# Patient Record
Sex: MALE | Race: WHITE | NOT HISPANIC OR LATINO | Employment: FULL TIME | URBAN - METROPOLITAN AREA
[De-identification: names, ages, dates, MRNs, and addresses within clinical notes are randomized per-mention and may not be internally consistent; named-entity substitution may affect disease eponyms.]

---

## 2023-05-12 ENCOUNTER — OFFICE VISIT (OUTPATIENT)
Dept: PRIMARY CARE | Facility: CLINIC | Age: 48
End: 2023-05-12
Payer: COMMERCIAL

## 2023-05-12 ENCOUNTER — LAB (OUTPATIENT)
Dept: LAB | Facility: LAB | Age: 48
End: 2023-05-12
Payer: COMMERCIAL

## 2023-05-12 VITALS
WEIGHT: 217.6 LBS | DIASTOLIC BLOOD PRESSURE: 78 MMHG | BODY MASS INDEX: 30.35 KG/M2 | SYSTOLIC BLOOD PRESSURE: 120 MMHG | TEMPERATURE: 97.8 F

## 2023-05-12 DIAGNOSIS — R74.8 ABNORMAL AST AND ALT: ICD-10-CM

## 2023-05-12 DIAGNOSIS — E78.5 HYPERLIPIDEMIA, UNSPECIFIED HYPERLIPIDEMIA TYPE: ICD-10-CM

## 2023-05-12 DIAGNOSIS — K21.9 GASTROESOPHAGEAL REFLUX DISEASE WITHOUT ESOPHAGITIS: ICD-10-CM

## 2023-05-12 DIAGNOSIS — R07.89 ATYPICAL CHEST PAIN: ICD-10-CM

## 2023-05-12 DIAGNOSIS — R73.01 IMPAIRED FASTING BLOOD SUGAR: Primary | ICD-10-CM

## 2023-05-12 DIAGNOSIS — G47.33 OBSTRUCTIVE SLEEP APNEA OF ADULT: ICD-10-CM

## 2023-05-12 DIAGNOSIS — R73.01 IMPAIRED FASTING BLOOD SUGAR: ICD-10-CM

## 2023-05-12 PROBLEM — Z98.84 S/P GASTRIC BYPASS: Status: ACTIVE | Noted: 2023-05-12

## 2023-05-12 LAB
ALANINE AMINOTRANSFERASE (SGPT) (U/L) IN SER/PLAS: 10 U/L (ref 10–52)
ANION GAP IN SER/PLAS: 11 MMOL/L (ref 10–20)
CALCIUM (MG/DL) IN SER/PLAS: 9.2 MG/DL (ref 8.6–10.3)
CARBON DIOXIDE, TOTAL (MMOL/L) IN SER/PLAS: 29 MMOL/L (ref 21–32)
CHLORIDE (MMOL/L) IN SER/PLAS: 107 MMOL/L (ref 98–107)
CHOLESTEROL (MG/DL) IN SER/PLAS: 165 MG/DL (ref 0–199)
CHOLESTEROL IN HDL (MG/DL) IN SER/PLAS: 47.8 MG/DL
CHOLESTEROL/HDL RATIO: 3.5
CREATININE (MG/DL) IN SER/PLAS: 0.87 MG/DL (ref 0.5–1.3)
GFR MALE: >90 ML/MIN/1.73M2
GLUCOSE (MG/DL) IN SER/PLAS: 80 MG/DL (ref 74–99)
LDL: 105 MG/DL (ref 0–99)
POTASSIUM (MMOL/L) IN SER/PLAS: 4.2 MMOL/L (ref 3.5–5.3)
SODIUM (MMOL/L) IN SER/PLAS: 143 MMOL/L (ref 136–145)
TRIGLYCERIDE (MG/DL) IN SER/PLAS: 62 MG/DL (ref 0–149)
UREA NITROGEN (MG/DL) IN SER/PLAS: 17 MG/DL (ref 6–23)
VLDL: 12 MG/DL (ref 0–40)

## 2023-05-12 PROCEDURE — 80061 LIPID PANEL: CPT

## 2023-05-12 PROCEDURE — 99214 OFFICE O/P EST MOD 30 MIN: CPT | Performed by: INTERNAL MEDICINE

## 2023-05-12 PROCEDURE — 1036F TOBACCO NON-USER: CPT | Performed by: INTERNAL MEDICINE

## 2023-05-12 PROCEDURE — 36415 COLL VENOUS BLD VENIPUNCTURE: CPT

## 2023-05-12 PROCEDURE — 83036 HEMOGLOBIN GLYCOSYLATED A1C: CPT

## 2023-05-12 PROCEDURE — 80048 BASIC METABOLIC PNL TOTAL CA: CPT

## 2023-05-12 PROCEDURE — 84460 ALANINE AMINO (ALT) (SGPT): CPT

## 2023-05-12 RX ORDER — SERTRALINE HYDROCHLORIDE 25 MG/1
25 TABLET, FILM COATED ORAL DAILY
COMMUNITY
End: 2024-04-08

## 2023-05-12 ASSESSMENT — PATIENT HEALTH QUESTIONNAIRE - PHQ9
1. LITTLE INTEREST OR PLEASURE IN DOING THINGS: NOT AT ALL
SUM OF ALL RESPONSES TO PHQ9 QUESTIONS 1 AND 2: 0
2. FEELING DOWN, DEPRESSED OR HOPELESS: NOT AT ALL

## 2023-05-12 NOTE — PROGRESS NOTES
Subjective   Patient ID: Mikhail Casarez is a 47 y.o. male who presents for Follow-up (6 month).    HPI   Overall well   1. Dyspnea on exertion- no further issues  2. Atypical chest pain- resolved.    3. IFBS- low sugar/carb intake  4. lipids- diet/exercise   5. abnl CXR- f/u pulm. consider f/u CXR  6. GERD-resolved   7. obesity- status post gastric sleeve surgery. No issues  8. RADHA- resolved. c/s sleep study when wt stabilizes. con't CPAP  9. premature ejaculation-on rx, controlled      Review of Systems   All other systems reviewed and are negative.      Objective   /78   Temp 36.6 °C (97.8 °F)   Wt 98.7 kg (217 lb 9.6 oz)   BMI 30.35 kg/m²     Physical Exam  Constitutional:       Appearance: Normal appearance.   Cardiovascular:      Rate and Rhythm: Normal rate and regular rhythm.      Pulses: Normal pulses.      Heart sounds: No murmur heard.     No gallop.   Pulmonary:      Effort: Pulmonary effort is normal. No respiratory distress.      Breath sounds: Normal breath sounds. No wheezing, rhonchi or rales.   Neurological:      Mental Status: He is alert.   Psychiatric:         Mood and Affect: Mood normal.         Behavior: Behavior normal.         Thought Content: Thought content normal.         Judgment: Judgment normal.         Assessment/Plan   Problem List Items Addressed This Visit          Nervous    Obstructive sleep apnea of adult       Digestive    GERD (gastroesophageal reflux disease)       Other    Abnormal AST and ALT    Relevant Orders    Alanine Aminotransferase    Atypical chest pain    Hyperlipidemia    Relevant Orders    Lipid Panel     Other Visit Diagnoses       Impaired fasting blood sugar    -  Primary    Relevant Orders    Hemoglobin A1C    Basic Metabolic Panel          1. Dyspnea on exertion- resolved. f/u pulm/cards PRN  2. Atypical chest pain-resolved. negative stress echo.  3. IFBS- follow-up blood sugars  4. lipids-retest, rx pending  5. abnl CXR- f/u pulm. consider f/u  CXR  6. GERD-resolved   7. obesity- status post gastric sleeve surgery. Follow-up surgery.  8. RADHA- resolved. c/s sleep study when wt stabilizes. con't CPAP  9. premature ejaculation-reviewed at length. con't low-dose SRI. Follow closely. Reviewed use risk and side effects.  Follow-up 6 months   colon cancer screening, reviewed importance again,  he will schedule.   LDCT at age 49

## 2023-05-13 LAB
ESTIMATED AVERAGE GLUCOSE FOR HBA1C: 114 MG/DL
HEMOGLOBIN A1C/HEMOGLOBIN TOTAL IN BLOOD: 5.6 %

## 2023-09-12 PROBLEM — K90.9 MALABSORPTION (HHS-HCC): Status: ACTIVE | Noted: 2023-09-12

## 2023-09-12 PROBLEM — E55.9 VITAMIN D DEFICIENCY: Status: ACTIVE | Noted: 2023-09-12

## 2023-09-12 PROBLEM — M21.40 FLAT FOOT: Status: ACTIVE | Noted: 2023-09-12

## 2023-09-12 PROBLEM — R03.0 ELEVATED BLOOD PRESSURE READING WITHOUT DIAGNOSIS OF HYPERTENSION: Status: ACTIVE | Noted: 2023-09-12

## 2023-09-12 PROBLEM — M21.062 ACQUIRED GENU VALGUM OF LEFT KNEE: Status: ACTIVE | Noted: 2023-09-12

## 2023-09-12 PROBLEM — J45.20 MILD INTERMITTENT ASTHMA WITHOUT COMPLICATION (HHS-HCC): Status: ACTIVE | Noted: 2023-09-12

## 2023-09-12 PROBLEM — E66.01 MORBID OBESITY (MULTI): Status: ACTIVE | Noted: 2023-09-12

## 2023-09-12 PROBLEM — F52.4 PREMATURE EJACULATION: Status: ACTIVE | Noted: 2023-09-12

## 2023-09-12 PROBLEM — E11.9 DIABETES MELLITUS (MULTI): Status: ACTIVE | Noted: 2023-09-12

## 2023-09-12 RX ORDER — METFORMIN HYDROCHLORIDE 500 MG/1
500 TABLET ORAL
COMMUNITY
End: 2023-11-03 | Stop reason: WASHOUT

## 2023-09-12 RX ORDER — PRENATAL VIT CALC,IRON,FOLIC
TABLET ORAL
COMMUNITY

## 2023-09-12 RX ORDER — OMEPRAZOLE 40 MG/1
40 CAPSULE, DELAYED RELEASE ORAL
COMMUNITY
End: 2023-11-03 | Stop reason: WASHOUT

## 2023-09-12 RX ORDER — IPRATROPIUM BROMIDE 21 UG/1
2 SPRAY, METERED NASAL 2 TIMES DAILY
COMMUNITY
End: 2023-11-03 | Stop reason: WASHOUT

## 2023-09-12 RX ORDER — URSODIOL 300 MG/1
1 CAPSULE ORAL 2 TIMES DAILY
COMMUNITY
Start: 2021-08-16 | End: 2023-11-03 | Stop reason: WASHOUT

## 2023-11-03 ENCOUNTER — OFFICE VISIT (OUTPATIENT)
Dept: PRIMARY CARE | Facility: CLINIC | Age: 48
End: 2023-11-03
Payer: COMMERCIAL

## 2023-11-03 VITALS
TEMPERATURE: 98.7 F | DIASTOLIC BLOOD PRESSURE: 78 MMHG | BODY MASS INDEX: 30.15 KG/M2 | WEIGHT: 220.8 LBS | SYSTOLIC BLOOD PRESSURE: 116 MMHG

## 2023-11-03 DIAGNOSIS — R73.01 IMPAIRED FASTING BLOOD SUGAR: Primary | ICD-10-CM

## 2023-11-03 DIAGNOSIS — E78.5 HYPERLIPIDEMIA, UNSPECIFIED HYPERLIPIDEMIA TYPE: ICD-10-CM

## 2023-11-03 DIAGNOSIS — Z12.11 ENCOUNTER FOR SCREENING FOR MALIGNANT NEOPLASM OF COLON: ICD-10-CM

## 2023-11-03 DIAGNOSIS — K21.9 GASTROESOPHAGEAL REFLUX DISEASE WITHOUT ESOPHAGITIS: ICD-10-CM

## 2023-11-03 PROCEDURE — 1036F TOBACCO NON-USER: CPT | Performed by: INTERNAL MEDICINE

## 2023-11-03 PROCEDURE — 3074F SYST BP LT 130 MM HG: CPT | Performed by: INTERNAL MEDICINE

## 2023-11-03 PROCEDURE — 99213 OFFICE O/P EST LOW 20 MIN: CPT | Performed by: INTERNAL MEDICINE

## 2023-11-03 PROCEDURE — 3044F HG A1C LEVEL LT 7.0%: CPT | Performed by: INTERNAL MEDICINE

## 2023-11-03 PROCEDURE — 3078F DIAST BP <80 MM HG: CPT | Performed by: INTERNAL MEDICINE

## 2023-11-03 ASSESSMENT — PATIENT HEALTH QUESTIONNAIRE - PHQ9
SUM OF ALL RESPONSES TO PHQ9 QUESTIONS 1 AND 2: 0
2. FEELING DOWN, DEPRESSED OR HOPELESS: NOT AT ALL
1. LITTLE INTEREST OR PLEASURE IN DOING THINGS: NOT AT ALL

## 2023-11-03 NOTE — PROGRESS NOTES
Subjective   Patient ID: Mikhail Casarez is a 48 y.o. male who presents for Follow-up (6 months).    HPI   Overall well   1. Dyspnea on exertion- no further issues  2. Atypical chest pain- resolved.    3. IFBS- low sugar/carb intake  4. lipids- diet/exercise   5. abnl CXR- f/u pulm. consider f/u CXR  6. GERD-resolved   7. obesity- status post gastric sleeve surgery. No issues  8. RADHA- resolved. c/s sleep study when wt stabilizes. con't CPAP  9. premature ejaculation-on rx, controlled      Review of Systems   All other systems reviewed and are negative.      Objective   /78   Temp 37.1 °C (98.7 °F)   Wt 100 kg (220 lb 12.8 oz)   BMI 30.15 kg/m²     Physical Exam  Constitutional:       Appearance: Normal appearance.   Cardiovascular:      Rate and Rhythm: Normal rate and regular rhythm.      Pulses: Normal pulses.      Heart sounds: No murmur heard.     No gallop.   Pulmonary:      Effort: Pulmonary effort is normal. No respiratory distress.      Breath sounds: Normal breath sounds. No wheezing, rhonchi or rales.   Neurological:      Mental Status: He is alert.   Psychiatric:         Mood and Affect: Mood normal.         Behavior: Behavior normal.         Thought Content: Thought content normal.         Judgment: Judgment normal.     Lab Results   Component Value Date    WBC 6.6 07/20/2023    HGB 14.9 07/20/2023    HCT 42.9 07/20/2023     07/20/2023    CHOL 165 05/12/2023    TRIG 62 05/12/2023    HDL 47.8 05/12/2023    ALT 13 07/20/2023    AST 14 07/20/2023     07/20/2023    K 3.8 07/20/2023     07/20/2023    CREATININE 0.9 07/20/2023    BUN 14 07/20/2023    CO2 26 07/20/2023    TSH 0.94 04/22/2021    INR 1.0 04/22/2021    HGBA1C 5.6 05/12/2023     Lab Results   Component Value Date    WBC 6.6 07/20/2023    HGB 14.9 07/20/2023    HCT 42.9 07/20/2023     07/20/2023    CHOL 165 05/12/2023    TRIG 62 05/12/2023    HDL 47.8 05/12/2023    ALT 13 07/20/2023    AST 14 07/20/2023      07/20/2023    K 3.8 07/20/2023     07/20/2023    CREATININE 0.9 07/20/2023    BUN 14 07/20/2023    CO2 26 07/20/2023    TSH 0.94 04/22/2021    INR 1.0 04/22/2021    HGBA1C 5.6 05/12/2023         Assessment/Plan   Problem List Items Addressed This Visit    None  1. Dyspnea on exertion- resolved. f/u pulm/cards PRN  2. Atypical chest pain-resolved. negative stress echo.  3. IFBS- follow-up blood sugars  4. lipids-retest, rx pending  5.  premature ejaculation-reviewed at length. con't low-dose SRI. Follow closely. Reviewed use risk and side effects.  6. GERD-resolved   7. obesity- status post gastric sleeve surgery. Follow-up surgery.  8. RADHA- resolved. c/s sleep study when wt stabilizes. con't CPAP     Follow-up 6 months   colon cancer screening, reviewed importance again,  he will schedule. reviewed  LDCT at age 49

## 2023-12-11 ENCOUNTER — TELEPHONE (OUTPATIENT)
Dept: PRIMARY CARE | Facility: CLINIC | Age: 48
End: 2023-12-11
Payer: COMMERCIAL

## 2023-12-11 NOTE — TELEPHONE ENCOUNTER
----- Message from Deepthi Ross MD sent at 12/10/2023  7:49 AM EST -----  Regarding: FW: [facility] clearance  Contact: [phone]  ok  ----- Message -----  From: Justa Lockhart CMA  Sent: 12/8/2023   6:52 AM EST  To: Deepthi Ross MD  Subject: FW: [facility] clearance                       ----- Message -----  From: Mikhail Casarez  Sent: 12/7/2023   1:18 PM EST  To:  [id number]  Subject: [facility] clearance                         This is the [facility] release form we discussed at my last appointment. If you could fill it out and send back I would appreciate it. Thanks

## 2023-12-14 ENCOUNTER — TELEPHONE (OUTPATIENT)
Dept: PRIMARY CARE | Facility: CLINIC | Age: 48
End: 2023-12-14
Payer: COMMERCIAL

## 2023-12-14 NOTE — TELEPHONE ENCOUNTER
----- Message from Deepthi Ross MD sent at 12/10/2023  7:49 AM EST -----  Regarding: FW: [facility] clearance  Contact: [phone]  ok  ----- Message -----  From: Justa Lockhart CMA  Sent: 12/8/2023   6:52 AM EST  To: Deepthi Ross MD  Subject: FW: [facility] clearance                       ----- Message -----  From: Mikhail Casarez  Sent: 12/7/2023   1:18 PM EST  To:  [id number]  Subject: [facility] clearance                         This is the [facility] [facility] release form we discussed at my last appointment. If you could fill it out and send back I would appreciate it. Thanks

## 2024-01-12 PROCEDURE — 88305 TISSUE EXAM BY PATHOLOGIST: CPT

## 2024-01-12 PROCEDURE — 88305 TISSUE EXAM BY PATHOLOGIST: CPT | Performed by: PATHOLOGY

## 2024-01-15 ENCOUNTER — LAB REQUISITION (OUTPATIENT)
Dept: LAB | Facility: HOSPITAL | Age: 49
End: 2024-01-15
Payer: COMMERCIAL

## 2024-01-15 DIAGNOSIS — Z12.11 ENCOUNTER FOR SCREENING FOR MALIGNANT NEOPLASM OF COLON: ICD-10-CM

## 2024-01-19 LAB
LABORATORY COMMENT REPORT: NORMAL
PATH REPORT.FINAL DX SPEC: NORMAL
PATH REPORT.GROSS SPEC: NORMAL
PATH REPORT.TOTAL CANCER: NORMAL

## 2024-02-23 DIAGNOSIS — M79.672 FOOT PAIN, LEFT: ICD-10-CM

## 2024-02-28 ENCOUNTER — HOSPITAL ENCOUNTER (OUTPATIENT)
Dept: RADIOLOGY | Facility: HOSPITAL | Age: 49
Discharge: HOME | End: 2024-02-28
Payer: COMMERCIAL

## 2024-02-28 ENCOUNTER — OFFICE VISIT (OUTPATIENT)
Dept: ORTHOPEDIC SURGERY | Facility: CLINIC | Age: 49
End: 2024-02-28
Payer: COMMERCIAL

## 2024-02-28 VITALS — HEIGHT: 72 IN | WEIGHT: 215 LBS | BODY MASS INDEX: 29.12 KG/M2

## 2024-02-28 DIAGNOSIS — M79.672 FOOT PAIN, LEFT: ICD-10-CM

## 2024-02-28 DIAGNOSIS — M72.2 PLANTAR FASCIITIS: Primary | ICD-10-CM

## 2024-02-28 PROCEDURE — 73630 X-RAY EXAM OF FOOT: CPT | Mod: LT

## 2024-02-28 PROCEDURE — 99213 OFFICE O/P EST LOW 20 MIN: CPT | Performed by: SPECIALIST

## 2024-02-28 PROCEDURE — 73630 X-RAY EXAM OF FOOT: CPT | Mod: LEFT SIDE | Performed by: RADIOLOGY

## 2024-02-28 PROCEDURE — 1036F TOBACCO NON-USER: CPT | Performed by: SPECIALIST

## 2024-02-28 NOTE — PROGRESS NOTES
Left foot pain - states the pain has changed since he was here last in 2021. No new injury.  He has recently started feeling a burning sensation in the bottom of his heel. Only last a little bit then subsides.   Has orthotics in his shoes, wants new ones.     Had bariatric surgery and has lost almost 150lbs.     Xrays done.     Exam: In stance he has symmetric bilateral pes planus without erythema or swelling.  Dermis intact neurovascular intact.  Minimal pain to palpation of the plantar left heel.    Radiographs: Show symmetric pes planus without significant other acute bone or joint abnormality    Assessment/plan: Flexible pes planus/hyperpronation.  Per description and exam possible early plantar fasciitis he is given a conservative treatment plan for that.  Mostly he is resolved his knee and foot issues with tremendous weight loss.  He is given a prescription for up-to-date custom foot orthotics as his current set are dated and lacking support.  Follow-up as needed

## 2024-04-07 DIAGNOSIS — F52.4 PREMATURE EJACULATION: ICD-10-CM

## 2024-04-08 RX ORDER — SERTRALINE HYDROCHLORIDE 25 MG/1
TABLET, FILM COATED ORAL
Qty: 90 TABLET | Refills: 2 | Status: SHIPPED | OUTPATIENT
Start: 2024-04-08

## 2024-04-19 ENCOUNTER — OFFICE VISIT (OUTPATIENT)
Dept: PRIMARY CARE | Facility: CLINIC | Age: 49
End: 2024-04-19
Payer: COMMERCIAL

## 2024-04-19 ENCOUNTER — LAB (OUTPATIENT)
Dept: LAB | Facility: LAB | Age: 49
End: 2024-04-19
Payer: COMMERCIAL

## 2024-04-19 VITALS — WEIGHT: 223.4 LBS | SYSTOLIC BLOOD PRESSURE: 124 MMHG | DIASTOLIC BLOOD PRESSURE: 82 MMHG | BODY MASS INDEX: 30.51 KG/M2

## 2024-04-19 DIAGNOSIS — G47.33 OBSTRUCTIVE SLEEP APNEA OF ADULT: ICD-10-CM

## 2024-04-19 DIAGNOSIS — F52.4 PREMATURE EJACULATION: ICD-10-CM

## 2024-04-19 DIAGNOSIS — E21.3 HYPERPARATHYROIDISM (MULTI): Primary | ICD-10-CM

## 2024-04-19 DIAGNOSIS — J45.20 MILD INTERMITTENT ASTHMA WITHOUT COMPLICATION (HHS-HCC): ICD-10-CM

## 2024-04-19 DIAGNOSIS — K21.9 GASTROESOPHAGEAL REFLUX DISEASE WITHOUT ESOPHAGITIS: ICD-10-CM

## 2024-04-19 DIAGNOSIS — R73.01 IMPAIRED FASTING BLOOD SUGAR: ICD-10-CM

## 2024-04-19 PROBLEM — E11.9 DIABETES MELLITUS (MULTI): Status: RESOLVED | Noted: 2023-09-12 | Resolved: 2024-04-19

## 2024-04-19 LAB
ANION GAP SERPL CALC-SCNC: 11 MMOL/L (ref 10–20)
BUN SERPL-MCNC: 18 MG/DL (ref 6–23)
CALCIUM SERPL-MCNC: 9.1 MG/DL (ref 8.6–10.3)
CHLORIDE SERPL-SCNC: 106 MMOL/L (ref 98–107)
CO2 SERPL-SCNC: 28 MMOL/L (ref 21–32)
CREAT SERPL-MCNC: 0.84 MG/DL (ref 0.5–1.3)
EGFRCR SERPLBLD CKD-EPI 2021: >90 ML/MIN/1.73M*2
ERYTHROCYTE [DISTWIDTH] IN BLOOD BY AUTOMATED COUNT: 13.2 % (ref 11.5–14.5)
EST. AVERAGE GLUCOSE BLD GHB EST-MCNC: 126 MG/DL
GLUCOSE SERPL-MCNC: 87 MG/DL (ref 74–99)
HBA1C MFR BLD: 6 %
HCT VFR BLD AUTO: 44.3 % (ref 41–52)
HGB BLD-MCNC: 14.9 G/DL (ref 13.5–17.5)
MCH RBC QN AUTO: 30.4 PG (ref 26–34)
MCHC RBC AUTO-ENTMCNC: 33.6 G/DL (ref 32–36)
MCV RBC AUTO: 90 FL (ref 80–100)
NRBC BLD-RTO: 0 /100 WBCS (ref 0–0)
PLATELET # BLD AUTO: 198 X10*3/UL (ref 150–450)
POTASSIUM SERPL-SCNC: 4.1 MMOL/L (ref 3.5–5.3)
RBC # BLD AUTO: 4.9 X10*6/UL (ref 4.5–5.9)
SODIUM SERPL-SCNC: 141 MMOL/L (ref 136–145)
WBC # BLD AUTO: 6.4 X10*3/UL (ref 4.4–11.3)

## 2024-04-19 PROCEDURE — 99214 OFFICE O/P EST MOD 30 MIN: CPT | Performed by: INTERNAL MEDICINE

## 2024-04-19 PROCEDURE — 1036F TOBACCO NON-USER: CPT | Performed by: INTERNAL MEDICINE

## 2024-04-19 PROCEDURE — 85027 COMPLETE CBC AUTOMATED: CPT

## 2024-04-19 PROCEDURE — 80048 BASIC METABOLIC PNL TOTAL CA: CPT

## 2024-04-19 PROCEDURE — 36415 COLL VENOUS BLD VENIPUNCTURE: CPT

## 2024-04-19 PROCEDURE — 83036 HEMOGLOBIN GLYCOSYLATED A1C: CPT

## 2024-04-19 ASSESSMENT — PATIENT HEALTH QUESTIONNAIRE - PHQ9
2. FEELING DOWN, DEPRESSED OR HOPELESS: NOT AT ALL
1. LITTLE INTEREST OR PLEASURE IN DOING THINGS: NOT AT ALL
SUM OF ALL RESPONSES TO PHQ9 QUESTIONS 1 AND 2: 0

## 2024-04-19 NOTE — PROGRESS NOTES
Subjective   Patient ID: Mikhail Casarez is a 48 y.o. male who presents for Follow-up (6 month fu).    HPI   Overall well   Less active over past mths    1. Dyspnea on exertion- no further issues  2. Atypical chest pain- resolved.    3. IFBS- low sugar/carb intake  4. lipids- diet/exercise   5. abnl CXR- f/u pulm. consider f/u CXR  6. GERD-resolved   7. obesity- status post gastric sleeve surgery. No issues  8. RADHA- resolved. c/s sleep study when wt stabilizes. con't CPAP  9. premature ejaculation-on rx, controlled      Review of Systems   All other systems reviewed and are negative.      Objective   /82 (BP Location: Left arm, Patient Position: Sitting, BP Cuff Size: Adult)   Wt 101 kg (223 lb 6.4 oz)   BMI 30.51 kg/m²     Physical Exam  Constitutional:       Appearance: Normal appearance.   Cardiovascular:      Rate and Rhythm: Normal rate and regular rhythm.      Pulses: Normal pulses.      Heart sounds: No murmur heard.     No gallop.   Pulmonary:      Effort: Pulmonary effort is normal. No respiratory distress.      Breath sounds: Normal breath sounds. No wheezing, rhonchi or rales.   Neurological:      Mental Status: He is alert.   Psychiatric:         Mood and Affect: Mood normal.         Behavior: Behavior normal.         Thought Content: Thought content normal.         Judgment: Judgment normal.       Lab Results   Component Value Date    WBC 6.6 07/20/2023    HGB 14.9 07/20/2023    HCT 42.9 07/20/2023     07/20/2023    CHOL 165 05/12/2023    TRIG 62 05/12/2023    HDL 47.8 05/12/2023    ALT 13 07/20/2023    AST 14 07/20/2023     07/20/2023    K 3.8 07/20/2023     07/20/2023    CREATININE 0.9 07/20/2023    BUN 14 07/20/2023    CO2 26 07/20/2023    TSH 0.94 04/22/2021    INR 1.0 04/22/2021    HGBA1C 5.6 05/12/2023           Assessment/Plan   Problem List Items Addressed This Visit    None  1. Dyspnea on exertion- resolved. f/u pulm/cards PRN  2. Atypical chest pain-resolved. negative  stress echo.  3. IFBS- follow-up blood sugars  4. lipids-retest, rx pending  5.  premature ejaculation-reviewed at length. con't low-dose SRI. Follow closely. Reviewed use risk and side effects.  6. GERD-resolved   7. obesity- status post gastric sleeve surgery. Follow-up surgery.  8. RADHA- resolved. c/s sleep study when wt stabilizes. con't CPAP    Colon polyps- scope 1/24--> 5 yrs per pt    Follow-up 6 months      LDCT at age 49

## 2024-07-31 ENCOUNTER — LAB (OUTPATIENT)
Dept: LAB | Facility: LAB | Age: 49
End: 2024-07-31
Payer: COMMERCIAL

## 2024-07-31 DIAGNOSIS — K90.9 INTESTINAL MALABSORPTION, UNSPECIFIED TYPE (HHS-HCC): ICD-10-CM

## 2024-07-31 LAB
25(OH)D3 SERPL-MCNC: 58 NG/ML (ref 31–100)
ALBUMIN SERPL-MCNC: 4.3 G/DL (ref 3.5–5)
ALP BLD-CCNC: 82 U/L (ref 35–125)
ALT SERPL-CCNC: 13 U/L (ref 5–40)
ANION GAP SERPL CALC-SCNC: 12 MMOL/L
AST SERPL-CCNC: 15 U/L (ref 5–40)
BASOPHILS # BLD AUTO: 0.04 X10*3/UL (ref 0–0.1)
BASOPHILS NFR BLD AUTO: 0.5 %
BILIRUB SERPL-MCNC: 0.6 MG/DL (ref 0.1–1.2)
BUN SERPL-MCNC: 20 MG/DL (ref 8–25)
CALCIUM SERPL-MCNC: 9.6 MG/DL (ref 8.5–10.4)
CHLORIDE SERPL-SCNC: 102 MMOL/L (ref 97–107)
CO2 SERPL-SCNC: 26 MMOL/L (ref 24–31)
CREAT SERPL-MCNC: 1 MG/DL (ref 0.4–1.6)
EGFRCR SERPLBLD CKD-EPI 2021: >90 ML/MIN/1.73M*2
EOSINOPHIL # BLD AUTO: 0.1 X10*3/UL (ref 0–0.7)
EOSINOPHIL NFR BLD AUTO: 1.3 %
ERYTHROCYTE [DISTWIDTH] IN BLOOD BY AUTOMATED COUNT: 13.2 % (ref 11.5–14.5)
FERRITIN SERPL-MCNC: 116 NG/ML (ref 30–400)
FOLATE SERPL-MCNC: 18.6 NG/ML (ref 4.2–19.9)
GLUCOSE SERPL-MCNC: 83 MG/DL (ref 65–99)
HCT VFR BLD AUTO: 44.3 % (ref 41–52)
HGB BLD-MCNC: 14.7 G/DL (ref 13.5–17.5)
IMM GRANULOCYTES # BLD AUTO: 0.02 X10*3/UL (ref 0–0.7)
IMM GRANULOCYTES NFR BLD AUTO: 0.3 % (ref 0–0.9)
IRON SATN MFR SERPL: 27 % (ref 12–50)
IRON SERPL-MCNC: 99 UG/DL (ref 45–160)
LYMPHOCYTES # BLD AUTO: 1.72 X10*3/UL (ref 1.2–4.8)
LYMPHOCYTES NFR BLD AUTO: 22.9 %
MCH RBC QN AUTO: 30 PG (ref 26–34)
MCHC RBC AUTO-ENTMCNC: 33.2 G/DL (ref 32–36)
MCV RBC AUTO: 90 FL (ref 80–100)
MONOCYTES # BLD AUTO: 0.42 X10*3/UL (ref 0.1–1)
MONOCYTES NFR BLD AUTO: 5.6 %
NEUTROPHILS # BLD AUTO: 5.22 X10*3/UL (ref 1.2–7.7)
NEUTROPHILS NFR BLD AUTO: 69.4 %
NRBC BLD-RTO: 0 /100 WBCS (ref 0–0)
PLATELET # BLD AUTO: 190 X10*3/UL (ref 150–450)
POTASSIUM SERPL-SCNC: 4.1 MMOL/L (ref 3.4–5.1)
PROT SERPL-MCNC: 6.2 G/DL (ref 5.9–7.9)
PTH-INTACT SERPL-MCNC: 64.4 PG/ML (ref 18.5–88)
RBC # BLD AUTO: 4.9 X10*6/UL (ref 4.5–5.9)
SODIUM SERPL-SCNC: 140 MMOL/L (ref 133–145)
TIBC SERPL-MCNC: 366 UG/DL (ref 228–428)
UIBC SERPL-MCNC: 267 UG/DL (ref 110–370)
VIT B12 SERPL-MCNC: 890 PG/ML (ref 211–946)
WBC # BLD AUTO: 7.5 X10*3/UL (ref 4.4–11.3)

## 2024-07-31 PROCEDURE — 36415 COLL VENOUS BLD VENIPUNCTURE: CPT

## 2024-07-31 PROCEDURE — 84425 ASSAY OF VITAMIN B-1: CPT

## 2024-07-31 PROCEDURE — 82607 VITAMIN B-12: CPT

## 2024-07-31 PROCEDURE — 82525 ASSAY OF COPPER: CPT

## 2024-07-31 PROCEDURE — 83970 ASSAY OF PARATHORMONE: CPT

## 2024-07-31 PROCEDURE — 83550 IRON BINDING TEST: CPT

## 2024-07-31 PROCEDURE — 85025 COMPLETE CBC W/AUTO DIFF WBC: CPT

## 2024-07-31 PROCEDURE — 82746 ASSAY OF FOLIC ACID SERUM: CPT

## 2024-07-31 PROCEDURE — 80053 COMPREHEN METABOLIC PANEL: CPT

## 2024-07-31 PROCEDURE — 83540 ASSAY OF IRON: CPT

## 2024-07-31 PROCEDURE — 82306 VITAMIN D 25 HYDROXY: CPT

## 2024-07-31 PROCEDURE — 84630 ASSAY OF ZINC: CPT

## 2024-07-31 PROCEDURE — 82728 ASSAY OF FERRITIN: CPT

## 2024-08-02 LAB
COPPER SERPL-MCNC: 95.1 UG/DL (ref 70–140)
ZINC SERPL-MCNC: 83.6 UG/DL (ref 60–120)

## 2024-08-05 NOTE — PROGRESS NOTES
Subjective   Patient ID: Mikhail Casarez is a 48 y.o. male who presents for No chief complaint on file..  HPI  3 YR FUV SLEEVE/ HHR/ UMBILICAL HERNIA REPAIR  START WT:  362  IDEAL WT:  182   START EXCESS: 180    TOTAL WT LOSS:  143     EWL:   79   % HT:  71.75   IN.  LAB RESULTS ARE IN EPIC  Review of Systems  Bariatric Surgery F/U:          Seen at ER after surgery? No.  Hospital admission? No.  Bariatric reoperation? No.  Bariatric intervention? No.  Was anticoagulation initiated for presumed/confirmed Vein Thrombosis/PE? No.  Was an incisional hernia noted on exam? No.  Sleep Apnea? No.  Still using C-PAP? No.  GERD req. meds? No.  Hyperlipidemia? No.  Still taking Cholesterol med? No.  Hypertension? No.  Still taking HTN med? No.  Number of Anti-hypertensive Medications: 0.  Diabetes? No.  Still taking DM med? No.  Current DM medication type: _____.         CONSTITUTIONAL:          Chills No.  Fatigue No.  Fever No.         CARDIOLOGY:          Negative for dizziness, chest pain, palpitations, shortness of breath.         RESPIRATORY:          Negative for chest congestion, cough, wheezing.         GASTROENTEROLOGY:          Food Intolerance No.  Acid reflux No.  Abdominal pain No.  Black stools No.  Constipation No.  Diarrhea No.  Loss of appetite no.  Nausea No.  Vomiting No.         UROLOGY:          Negative for dysuria, urinary urgency, kidney stones.         PSYCHOLOGY:          Negative for depression, anxiety, high stress level.   Objective   Physical Exam    Assessment/Plan            Elvira Steinberg LPN 08/05/24 11:06 AM

## 2024-08-06 LAB — VIT B1 PYROPHOSHATE BLD-SCNC: 150 NMOL/L (ref 70–180)

## 2024-08-08 ENCOUNTER — OFFICE VISIT (OUTPATIENT)
Dept: SURGERY | Facility: CLINIC | Age: 49
End: 2024-08-08
Payer: COMMERCIAL

## 2024-08-08 ENCOUNTER — NUTRITION (OUTPATIENT)
Dept: SURGERY | Facility: CLINIC | Age: 49
End: 2024-08-08

## 2024-08-08 VITALS
DIASTOLIC BLOOD PRESSURE: 75 MMHG | BODY MASS INDEX: 29.66 KG/M2 | SYSTOLIC BLOOD PRESSURE: 118 MMHG | HEIGHT: 72 IN | WEIGHT: 219 LBS | HEART RATE: 73 BPM

## 2024-08-08 DIAGNOSIS — Z90.3 HISTORY OF SLEEVE GASTRECTOMY: ICD-10-CM

## 2024-08-08 DIAGNOSIS — E53.8 B12 DEFICIENCY: ICD-10-CM

## 2024-08-08 DIAGNOSIS — E21.3 HYPERPARATHYROIDISM (MULTI): ICD-10-CM

## 2024-08-08 DIAGNOSIS — E55.9 VITAMIN D DEFICIENCY: ICD-10-CM

## 2024-08-08 DIAGNOSIS — K90.9 INTESTINAL MALABSORPTION, UNSPECIFIED TYPE (HHS-HCC): Primary | ICD-10-CM

## 2024-08-08 PROCEDURE — 3008F BODY MASS INDEX DOCD: CPT | Performed by: SURGERY

## 2024-08-08 PROCEDURE — 99214 OFFICE O/P EST MOD 30 MIN: CPT | Performed by: SURGERY

## 2024-08-08 ASSESSMENT — PAIN SCALES - GENERAL: PAINLEVEL: 0-NO PAIN

## 2024-08-08 ASSESSMENT — PATIENT HEALTH QUESTIONNAIRE - PHQ9
1. LITTLE INTEREST OR PLEASURE IN DOING THINGS: NOT AT ALL
2. FEELING DOWN, DEPRESSED OR HOPELESS: NOT AT ALL
SUM OF ALL RESPONSES TO PHQ9 QUESTIONS 1 AND 2: 0

## 2024-08-08 ASSESSMENT — COLUMBIA-SUICIDE SEVERITY RATING SCALE - C-SSRS
2. HAVE YOU ACTUALLY HAD ANY THOUGHTS OF KILLING YOURSELF?: NO
1. IN THE PAST MONTH, HAVE YOU WISHED YOU WERE DEAD OR WISHED YOU COULD GO TO SLEEP AND NOT WAKE UP?: NO
6. HAVE YOU EVER DONE ANYTHING, STARTED TO DO ANYTHING, OR PREPARED TO DO ANYTHING TO END YOUR LIFE?: NO

## 2024-08-08 NOTE — PROGRESS NOTES
Bariatric Post-Op Follow Up Appointment: 3 yr VSG     Current Weight: 219 lbs  Current BMI: 29.91   Percentage of weight loss achieved: 79    Dietary Intake: the lifelong rules   Breakfast- a Premier Protein shake   Lunch- same options as dinner   Dinner- mostly fish or chicken. Pork on occasion   Volume of food at a time: 8-10 oz per meal   Snacks: none   Beverages: mostly water with flavored drops, daily protein shake, zero sugar soda on occasion   Alcohol Intake: very rare; 1 drink every few months   Do you drink with your meals? No   Current Exercise: gym exercise 5x/week, plus hiking/yoga on the weekends. Mikhail also does a althea 5K monthly.   Vitamins/minerals: Celebrate MVI 1x/day plus calcium citrate at least 2x/day.  Food intolerances? Pizza   Any decrease in energy levels? No   Any questions or concerns? No       Sara Cordero RD, LDN  Registered Dietitian, Licensed Dietitian Nutritionist

## 2024-08-08 NOTE — H&P
History Of Present Illness  Mikhail Casarez is a 48 y.o. man here for 3 year follow up from [facility].  He feels great.  He has no GERD.  He has minimal hunger.  He has lost 79% of his excess weight.  He feels full with 8oz of food.  He is following the rules.  He takes celebrate 18 and calcium citrate tid.  He had labwork for this visit.  His labs are normal.  He goes to the gym 5 days per week and does yoga or hikes on the weekend.    3 YR FUV SLEEVE/ HHR/ UMBILICAL HERNIA REPAIR  START WT:  362  IDEAL WT:  182   START EXCESS: 180    TOTAL WT LOSS:  143     EWL:   79   % HT:  71.75   IN.  Past Medical History  He has a past medical history of Anxiety and Depression.    Surgical History  He has a past surgical history that includes Other surgical history (09/16/2021).     Social History  He reports that he quit smoking about 3 years ago. His smoking use included cigarettes. He has never used smokeless tobacco. He reports that he does not currently use alcohol. He reports that he does not use drugs.    Family History  Family History   Problem Relation Name Age of Onset    Other (Brain Tumor) Mother          benign    Throat cancer Father      Lung cancer Father      Macular degeneration Maternal Grandmother      Hypertension Maternal Grandmother      Prostate cancer Maternal Grandfather      Dementia Paternal Grandmother          [place]    Lung cancer Paternal Grandfather          Allergies  Cat dander and House dust    Review of Systems   Bariatric Surgery F/U:          Seen at ER after surgery? No.  Hospital admission? No.  Bariatric reoperation? No.  Bariatric intervention? No.  Was anticoagulation initiated for presumed/confirmed Vein Thrombosis/PE? No.  Was an incisional hernia noted on exam? No.  Sleep Apnea? No.  Still using C-PAP? No.  GERD req. meds? No.  Hyperlipidemia? No.  Still taking Cholesterol med? No.  Hypertension? No.  Still taking HTN med? No.  Number of Anti-hypertensive Medications: 0.  Diabetes?  "No.  Still taking DM med? No.  Current DM medication type: _____.         CONSTITUTIONAL:          Chills No.  Fatigue No.  Fever No.         CARDIOLOGY:          Negative for dizziness, chest pain, palpitations, shortness of breath.         RESPIRATORY:          Negative for chest congestion, cough, wheezing.         GASTROENTEROLOGY:          Food Intolerance No.  Acid reflux No.  Abdominal pain No.  Black stools No.  Constipation No.  Diarrhea No.  Loss of appetite no.  Nausea No.  Vomiting No.         UROLOGY:          Negative for dysuria, urinary urgency, kidney stones.         PSYCHOLOGY:          Negative for depression, anxiety, high stress level.         Last Recorded Vitals  Blood pressure 118/75, pulse 73, height 1.822 m (5' 11.75\"), weight 99.3 kg (219 lb).    Relevant Results   Latest Reference Range & Units 07/31/24 11:17   GLUCOSE 65 - 99 mg/dL 83   SODIUM 133 - 145 mmol/L 140   POTASSIUM 3.4 - 5.1 mmol/L 4.1   CHLORIDE 97 - 107 mmol/L 102   Bicarbonate 24 - 31 mmol/L 26   Anion Gap <=19 mmol/L 12   Blood Urea Nitrogen 8 - 25 mg/dL 20   Creatinine 0.40 - 1.60 mg/dL 1.00   EGFR >60 mL/min/1.73m*2 >90   Calcium 8.5 - 10.4 mg/dL 9.6   Albumin 3.5 - 5.0 g/dL 4.3   Alkaline Phosphatase 35 - 125 U/L 82   ALT 5 - 40 U/L 13   AST 5 - 40 U/L 15   Bilirubin Total 0.1 - 1.2 mg/dL 0.6   FERRITIN 30 - 400 ng/mL 116   FOLATE 4.2 - 19.9 ng/mL 18.6   Total Protein 5.9 - 7.9 g/dL 6.2   IRON 45 - 160 ug/dL 99   TIBC 228 - 428 ug/dL 366   UIBC 110 - 370 ug/dL 267   % Saturation 12 - 50 % 27   Vitamin B12 211 - 946 pg/mL 890   Copper 70.0 - 140.0 ug/dL 95.1   Zinc, Serum or Plasma 60.0 - 120.0 ug/dL 83.6   Vitamin B1, Whole Blood 70 - 180 nmol/L 150   Parathyroid Hormone, Intact 18.5 - 88.0 pg/mL 64.4   Vitamin D, 25-Hydroxy, Total 31 - 100 ng/mL 58   LEUKOCYTES (10*3/UL) IN BLOOD BY AUTOMATED COUNT, Lithuanian 4.4 - 11.3 x10*3/uL 7.5   nRBC 0.0 - 0.0 /100 WBCs 0.0   ERYTHROCYTES (10*6/UL) IN BLOOD BY AUTOMATED COUNT, " Trinidadian 4.50 - 5.90 x10*6/uL 4.90   HEMOGLOBIN 13.5 - 17.5 g/dL 14.7   HEMATOCRIT 41.0 - 52.0 % 44.3   MCV 80 - 100 fL 90   MCH 26.0 - 34.0 pg 30.0   MCHC 32.0 - 36.0 g/dL 33.2   RED CELL DISTRIBUTION WIDTH 11.5 - 14.5 % 13.2   PLATELETS (10*3/UL) IN BLOOD AUTOMATED COUNT, Trinidadian 150 - 450 x10*3/uL 190   NEUTROPHILS/100 LEUKOCYTES IN BLOOD BY AUTOMATED COUNT, Trinidadian 40.0 - 80.0 % 69.4   Immature Granulocytes %, Automated 0.0 - 0.9 % 0.3   Lymphocytes % 13.0 - 44.0 % 22.9   Monocytes % 2.0 - 10.0 % 5.6   Eosinophils % 0.0 - 6.0 % 1.3   Basophils % 0.0 - 2.0 % 0.5   NEUTROPHILS (10*3/UL) IN BLOOD BY AUTOMATED COUNT, Trin[place]adian 1.20 - 7.70 x10*3/uL 5.22   Immature Granulocytes Absolute, Automated 0.00 - 0.70 x10*3/uL 0.02   Lymphocytes Absolute 1.20 - 4.80 x10*3/uL 1.72   Monocytes Absolute 0.10 - 1.00 x10*3/uL 0.42   Eosinophils Absolute 0.00 - 0.70 x10*3/uL 0.10   Basophils Absolute 0.00 - 0.10 x10*3/uL 0.04        Assessment/Plan   Problem List Items Addressed This Visit             ICD-10-CM    Vitamin D deficiency E55.9    Relevant Orders    Parathyroid Hormone, Intact    Vitamin D 25-Hydroxy,Total (for eval of Vitamin D levels)    Malabsorption ([facility]) - Primary K90.9    Relevant Orders    CBC and Auto Differential    Comprehensive Metabolic Panel    Copper, Blood    Ferritin    Folate    Iron and TIBC    Parathyroid Hormone, Intact    Vitamin B1, Whole Blood    Vitamin B12    Vitamin D 25-Hydroxy,Total (for eval of Vitamin D levels)    Zinc, Serum or Plasma    Hyperparathyroidism (Multi) E21.3    Relevant Orders    Parathyroid Hormone, Intact    Vitamin D 25-Hydroxy,Total (for eval of Vitamin D levels)    History of sleeve gastrectomy Z90.3    B12 deficiency E53.8    Relevant Orders    Vitamin B12       We reviewed the rules necessary for long term success after weight loss surgery.  We discussed the need for lifelong nutritional supplementation after weight loss surgery and they were given a handout.  We  will obtain labwork at next visit.  We discussed the importance of excercising with moderate intensity a minimum of five days per week for at least 30 minutes.         Jorge A Villalobos MD

## 2024-08-11 PROBLEM — E53.8 B12 DEFICIENCY: Status: ACTIVE | Noted: 2024-08-11

## 2025-06-13 DIAGNOSIS — F52.4 PREMATURE EJACULATION: ICD-10-CM

## 2025-06-13 RX ORDER — SERTRALINE HYDROCHLORIDE 25 MG/1
25 TABLET, FILM COATED ORAL DAILY
Qty: 90 TABLET | Refills: 1 | Status: SHIPPED | OUTPATIENT
Start: 2025-06-13

## 2025-07-14 ENCOUNTER — PATIENT MESSAGE (OUTPATIENT)
Dept: PRIMARY CARE | Facility: CLINIC | Age: 50
End: 2025-07-14
Payer: COMMERCIAL

## 2025-07-14 DIAGNOSIS — H93.8X2 EAR PRESSURE, LEFT: ICD-10-CM

## 2025-07-14 DIAGNOSIS — R42 VERTIGO: ICD-10-CM

## 2025-07-29 ENCOUNTER — CLINICAL SUPPORT (OUTPATIENT)
Dept: AUDIOLOGY | Facility: CLINIC | Age: 50
End: 2025-07-29
Payer: COMMERCIAL

## 2025-07-29 DIAGNOSIS — H93.12 TINNITUS OF LEFT EAR: ICD-10-CM

## 2025-07-29 DIAGNOSIS — H90.42 SENSORINEURAL HEARING LOSS (SNHL) OF LEFT EAR WITH UNRESTRICTED HEARING OF RIGHT EAR: Primary | ICD-10-CM

## 2025-07-29 DIAGNOSIS — R42 VERTIGO: ICD-10-CM

## 2025-07-29 PROCEDURE — 92550 TYMPANOMETRY & REFLEX THRESH: CPT | Mod: 52 | Performed by: AUDIOLOGIST

## 2025-07-29 PROCEDURE — 92557 COMPREHENSIVE HEARING TEST: CPT | Performed by: AUDIOLOGIST

## 2025-07-29 NOTE — PROGRESS NOTES
Chief Complaint   Patient presents with    Hearing Loss    Tinnitus    Dizziness     AUDIOLOGY AUDIOMETRIC EVALUATION      Name:  Mikhail Casarez   :  1975  Age:  49 y.o.  Date of Evaluation:  2025    Time: 851-916    IMPRESSIONS     Hearing sensitivity within normal limits for 125-8000 Hz. in the right ear, and moderate severe to profound hearing loss in the left ear.  Word recognition in quiet is excellent in the right ear, and poor in the left ear.  Tympanometry indicates normal middle ear pressure and tympanic membrane mobility in both ears.       RECOMMENDATIONS     Follow up with otolaryngology as scheduled  Retest hearing levels in conjunction with otological management  Retest hearing levels annually  Hearing protection  Consideration of hearing aid left ear    HISTORY     Reason for visit:  Mr. Casarez is seen today for an initial audiologic evaluation at the request of Ruben Teixeira PA-C.  Mr. Casarez reports onset of left hearing loss beginning eight to nine years ago.  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  Since that time he has constant tinnitus in the left ear.  Six months ago he reports sinus infection with fluid in the left ear.  Recently he notes left sided pressure that triggers vertigo.  The vertigo last for a few hours.  He states he feels as if his head is not attached to his body.  There is no report of hearing loss or tinnitus in the right ear.  Please see medical records for complete history. History obtained from patient report and chart review.     Change in Hearing: yes in the left ear  Tinnitus: yes in the left ear ; constant  Otalgia: yes in the left ear  Aural Pressure/Fullness: yes in the left ear  Recent Ear Infections/Illness: possible recent left ear infection  Otorrhea: no  Dizziness: yes, lasts for a few hours  History of Ear Surgeries: denied  History of Noise Exposure: denied  Family History of Hearing Loss: unknown  Hearing  "Aid Use: None    EVALUATION     See scanned audiogram in \"Media\"     TEST RESULTS     Otoscopic Evaluation:  Right Ear: Ear canal clear, tympanic membrane visualized.  Left Ear: Ear canal clear, tympanic membrane visualized.    Tympanometry (226 Hz):  Right Ear: Type A, middle ear pressure and tympanic membrane compliance within normal limits.   Left Ear: Type A, middle ear pressure and tympanic membrane compliance within normal limits.     Acoustic Reflexes:   Right Ear: (Ipsilateral) Responses present at expected levels for 500-2000 and 500-4000 Hz.  Left Ear: (Ipsilateral) Responses absent at 500-2000 and 500-4000 Hz.    Test technique:  Pure Tone Audiometry via headphones  Reliability: Good    Pure Tone Audiometry:    Right Ear: Hearing sensitivity within normal limits for 125-8000 Hz.  Left Ear: Moderate severe to profound sensorineural hearing loss 125 -8000 Hz    Speech Audiometry:   Right Ear:  Speech Reception Threshold (SRT) was obtained at 10 dB HL. Word Recognition scores were excellent (100%) in quiet when words were presented at 50 dB HL. These results are based on [facility] Auditory Test No.6 (NU-6) Ordered by difficulty (N=10).   Left Ear:  Speech Reception Threshold (SRT) was obtained at 70 dB HL. Word Recognition scores were poor (60%) in quiet when words were presented at 90 dB HL. These results are based on [facility] Auditory Test No.6 (NU-6) Ordered by difficulty (N=25).     Comparison of today's results with previous test results: No previous results available.         PATIENT EDUCATION     Discussed results and recommendations with Mr. Casarez. Questions were addressed and the patient was encouraged to contact our department at [phone] should concerns arise.       Mikayla Spears, CCC-A  Senior Clinical Audiologist      Degree of   Hearing Sensitivity dB Range   Within Normal Limits (WNL) 0 - 20   Slight 25   Mild 26 - 40   Moderate 41 - 55 "   Moderately-Severe 56 - 70   Severe 71 - 90   Profound 91 +     Key   CHL Conductive Hearing Loss   ECV Ear Canal Volume   HA Hearing Aid   NIHL Noise-Induced Hearing Loss   PTA Pure Tone Average   SNHL Sensorineural Hearing Loss   TM Tympanic Membrane   WNL Within Normal Limits

## 2025-07-30 ENCOUNTER — APPOINTMENT (OUTPATIENT)
Dept: SURGERY | Facility: CLINIC | Age: 50
End: 2025-07-30
Payer: COMMERCIAL

## 2025-07-30 ENCOUNTER — OFFICE VISIT (OUTPATIENT)
Dept: OTOLARYNGOLOGY | Facility: CLINIC | Age: 50
End: 2025-07-30
Payer: COMMERCIAL

## 2025-07-30 VITALS — BODY MASS INDEX: 29.66 KG/M2 | HEIGHT: 72 IN | WEIGHT: 219 LBS

## 2025-07-30 DIAGNOSIS — H90.42 SENSORINEURAL HEARING LOSS (SNHL) OF LEFT EAR WITH UNRESTRICTED HEARING OF RIGHT EAR: Primary | ICD-10-CM

## 2025-07-30 DIAGNOSIS — R42 DIZZY: ICD-10-CM

## 2025-07-30 DIAGNOSIS — J31.0 CHRONIC RHINITIS: ICD-10-CM

## 2025-07-30 PROCEDURE — 99203 OFFICE O/P NEW LOW 30 MIN: CPT | Performed by: PHYSICIAN ASSISTANT

## 2025-07-30 PROCEDURE — 3008F BODY MASS INDEX DOCD: CPT | Performed by: PHYSICIAN ASSISTANT

## 2025-07-30 RX ORDER — PREDNISONE 20 MG/1
TABLET ORAL
Qty: 9 TABLET | Refills: 0 | Status: SHIPPED | OUTPATIENT
Start: 2025-07-30

## 2025-07-30 RX ORDER — AZELASTINE 1 MG/ML
2 SPRAY, METERED NASAL 2 TIMES DAILY
Qty: 30 ML | Refills: 11 | Status: SHIPPED | OUTPATIENT
Start: 2025-07-30 | End: 2026-07-30

## 2025-07-30 NOTE — PROGRESS NOTES
Mikhail Casarez is a 49 y.o. year old male patient with OTHER (Ear pressure, left, vertigo)     Patient presents to the office today for assessment of his left ear.  The patient states that for at least the last 8 to 10 years he has had significantly declined hearing in the left ear.  He states that years ago he had an ear infection which he believes resulted in a perforation as he had significant pain which then was relieved after the ear began to drain.  He did not seek any medical treatment at that time.  He states that he has essentially been living with the muffled hearing and tinnitus in the left ear since that time.  More recently in the last month or so he states that he has been experiencing some dizziness.  He indicates that he has had 2 episodes of what he would describe vertigo.  He states that the first episode was approximately an hour in length and the second was 1 to 2 hours.  He states that this resolved spontaneously after rest.  He has noticed when doing yoga recently he has had some slight positional dizziness with various positions creating some short temporary room spinning sensation.  He has had some left sided head and ear pressure as well.  He denies any change to his hearing.  He does suffer from chronic rhinitis which she states has been an ongoing issue for the last few years.  He tried nasal steroids which were unaffected at treating his intermittent congestion postnasal drip and occasional rhinorrhea.  He denies chronic recurrent sinusitis.  He is without other ENT related concerns at this time.      Review of Systems   All other systems reviewed and are negative.        Physical Exam:   General appearance: No acute distress. Normal facies. Symmetric facial movement. No gross lesions of the face are noted.  The external ear structures appear normal. The ear canals patent and the tympanic membranes are intact without evidence of air-fluid levels, retraction, or congenital defects.   Anterior rhinoscopy notes essentially a midline nasal septum. Examination is noted for normal healthy mucosal membranes without any evidence of lesions, polyps, or exudate. The tongue is normally mobile. There are no lesions on the gingiva, buccal, or oral mucosa. There are no oral cavity masses.  The neck is negative for mass lymphadenopathy. The trachea and parotid are clear. The thyroid bed is grossly unremarkable. The salivary gland structures are grossly unremarkable.    Audiometric evaluation demonstrates normal hearing in the right ear through 8000 Hz with normal tympanometry and excellent word discrimination.  The left ear is noted for moderately severe to profound hearing loss which is sensorineural.  The patient has poor word discrimination at 60% in the left ear with normal tympanometry.    Assessment/Plan   1.  Sensorineural hearing loss left ear  2.  Dizziness   3.  Chronic rhinitis    Patient was seen in the office today for assessment of ears.  Patient with concern for dizziness and pressure and tinnitus in the left ear.  He reports an 8 to 10-year history of hearing loss left greater than right which she states occurred suddenly after an ear infection and what he suspects could have been a perforation but did not seek treatment at that time.  He reports that there has been no change in his hearing with his recent onset of dizziness and left ear fullness over the last 4+ weeks.  At this time the patient is going to be set up for MRI IAC for further assessment to rule out any retrocochlear pathology.  Additionally the patient will be started on a short taper of oral prednisone to see if this helps alleviate some of his acute symptoms of dizziness and pressure fullness sensation.  The patient will also be started on azelastine for his chronic rhinitis concerns.  I recommend low sodium and low caffeine and low stress to the best of his ability.  We will discuss the results of the MRI upon  completion.      All questions and concerns were answered and addressed. The patient expresses understanding and will follow up as advised.  Thank you again for allowing us to participate in the care of this patient.

## 2025-08-14 ENCOUNTER — APPOINTMENT (OUTPATIENT)
Dept: SURGERY | Facility: CLINIC | Age: 50
End: 2025-08-14
Payer: COMMERCIAL

## 2025-08-15 ENCOUNTER — APPOINTMENT (OUTPATIENT)
Dept: RADIOLOGY | Facility: CLINIC | Age: 50
End: 2025-08-15
Payer: COMMERCIAL

## 2025-08-15 ENCOUNTER — HOSPITAL ENCOUNTER (OUTPATIENT)
Dept: RADIOLOGY | Facility: HOSPITAL | Age: 50
Discharge: HOME | End: 2025-08-15
Payer: COMMERCIAL

## 2025-08-15 DIAGNOSIS — H90.42 SENSORINEURAL HEARING LOSS (SNHL) OF LEFT EAR WITH UNRESTRICTED HEARING OF RIGHT EAR: ICD-10-CM

## 2025-08-15 DIAGNOSIS — R42 DIZZY: ICD-10-CM

## 2025-08-15 PROCEDURE — 70553 MRI BRAIN STEM W/O & W/DYE: CPT

## 2025-08-15 PROCEDURE — 2550000001 HC RX 255 CONTRASTS: Performed by: PHYSICIAN ASSISTANT

## 2025-08-15 PROCEDURE — A9575 INJ GADOTERATE MEGLUMI 0.1ML: HCPCS | Performed by: PHYSICIAN ASSISTANT

## 2025-08-15 RX ORDER — GADOTERATE MEGLUMINE 376.9 MG/ML
20 INJECTION INTRAVENOUS
Status: COMPLETED | OUTPATIENT
Start: 2025-08-15 | End: 2025-08-15

## 2025-08-15 RX ADMIN — GADOTERATE MEGLUMINE 20 ML: 376.9 INJECTION INTRAVENOUS at 17:57

## 2025-09-04 ENCOUNTER — APPOINTMENT (OUTPATIENT)
Dept: OTOLARYNGOLOGY | Facility: CLINIC | Age: 50
End: 2025-09-04
Payer: COMMERCIAL

## 2025-09-04 PROBLEM — R93.89 ABNORMAL MRI: Status: ACTIVE | Noted: 2025-09-04

## 2025-09-19 ENCOUNTER — APPOINTMENT (OUTPATIENT)
Dept: AUDIOLOGY | Facility: CLINIC | Age: 50
End: 2025-09-19
Payer: COMMERCIAL